# Patient Record
Sex: MALE | Race: WHITE | NOT HISPANIC OR LATINO | Employment: FULL TIME | URBAN - METROPOLITAN AREA
[De-identification: names, ages, dates, MRNs, and addresses within clinical notes are randomized per-mention and may not be internally consistent; named-entity substitution may affect disease eponyms.]

---

## 2017-06-13 ENCOUNTER — ALLSCRIPTS OFFICE VISIT (OUTPATIENT)
Dept: OTHER | Facility: OTHER | Age: 22
End: 2017-06-13

## 2017-10-10 ENCOUNTER — ALLSCRIPTS OFFICE VISIT (OUTPATIENT)
Dept: OTHER | Facility: OTHER | Age: 22
End: 2017-10-10

## 2017-10-11 NOTE — PROGRESS NOTES
Assessment  1  Sebaceous cyst (706 2) (L72 3)    Discussion/Summary    [place] spots / seb cystthis is not herpes , rec conservative care ,  The patient was counseled regarding instructions for management,-patient and family education,-importance of compliance with treatment  Possible side effects of new medications were reviewed with the patient/guardian today  The treatment plan was reviewed with the patient/guardian  The patient/guardian understands and agrees with the treatment plan      Chief Complaint  Patient is in the office today complaining of having white blisters in his lips and he would like to be checked for herpes  History of Present Illness  HPI: has thes small white bumps on the inside of lips , hard to tell they are there , has just started to notice them? not tender, nor blister, do not get any worse actually not sure not treated with anything changes in diet it be herpes? Review of Systems    Constitutional: no fever or chills, feels well, no tiredness, no recent weight loss or weight gain  ENT: no complaints of earache, no loss of hearing, no nosebleeds or nasal discharge, no sore throat or hoarseness  Cardiovascular: no complaints of slow or fast heart rate, no chest pain, no palpitations, no leg claudication or lower extremity edema  Respiratory: no complaints of shortness of breath, no wheezing or cough, no dyspnea on exertion, no orthopnea or PND  Gastrointestinal: no complaints of abdominal pain, no constipation, no nausea or vomiting, no diarrhea or bloody stools  Genitourinary: no complaints of dysuria or incontinence, no hesitancy, no nocturia, no genital lesion, no inadequacy of penile erection  Integumentary: skin lesion  ROS reviewed  Active Problems  1  Need for influenza vaccination (V04 81) (Z23)    Past Medical History  Active Problems And Past Medical History Reviewed:    The active problems and past medical history were reviewed and updated today  Family History  Mother    1  Denied: Family history of mental disorder   2  Denied: Family history of substance abuse  Father    3  Denied: Family history of mental disorder   4  Denied: Family history of substance abuse  Family History Reviewed: The family history was reviewed and updated today  Social History   · Always uses seat belt   · Denies alcohol consumption (V49 89) (Z78 9)   · Employed   · Never a smoker   · Seeing a dentist   · Single  The social history was reviewed and updated today  The social history was reviewed and is unchanged  Surgical History  Surgical History Reviewed: The surgical history was reviewed and updated today  Current Meds   1  Clindamycin Phosphate GEL; Therapy: (Recorded:[id number]) to Recorded    The medication list was reviewed and updated today  Allergies  1  No Known Drug Allergies    Vitals   Recorded: [id number] 09:14AM   Temperature 97 2 F   Heart Rate 63   Respiration 15   Systolic 523   Diastolic 62   Height 6 ft 2 in   Weight 153 lb 2 oz   BMI Calculated 19 66   BSA Calculated 1 94   O2 Saturation 97     Physical Exam    Constitutional   General appearance: No acute distress, well appearing and well nourished  Eyes   Conjunctiva and lids: No swelling, erythema, or discharge  Pupils and irises: Equal, round and reactive to light  Ears, Nose, Mouth, and Throat   External inspection of ears and nose: Normal     Otoscopic examination: Tympanic membrance translucent with normal light reflex  Canals patent without erythema  Nasal mucosa, septum, and turbinates: Normal without edema or erythema  -small less than mm , yellow / white at vermillion border  Pulmonary   Respiratory effort: No increased work of breathing or signs of respiratory distress  Lymphatic   Palpation of lymph nodes in neck: No lymphadenopathy  Skin   Skin and subcutaneous tissue: Normal without rashes or lesions      Psychiatric   Orientation to person, place and time: Normal     Mood and affect: Normal          Signatures   Electronically signed by :  SHERI Sandoval; Oct 10 2017 11:00AM EST                       (Author)    Electronically signed by : IMANI Mejia ; Oct 10 2017 11:36AM EST

## 2018-01-13 VITALS
HEART RATE: 60 BPM | SYSTOLIC BLOOD PRESSURE: 108 MMHG | OXYGEN SATURATION: 99 % | WEIGHT: 166.38 LBS | BODY MASS INDEX: 21.35 KG/M2 | DIASTOLIC BLOOD PRESSURE: 64 MMHG | HEIGHT: 74 IN

## 2018-01-13 VITALS
SYSTOLIC BLOOD PRESSURE: 118 MMHG | BODY MASS INDEX: 19.65 KG/M2 | HEIGHT: 74 IN | WEIGHT: 153.13 LBS | TEMPERATURE: 97.2 F | HEART RATE: 63 BPM | OXYGEN SATURATION: 97 % | RESPIRATION RATE: 15 BRPM | DIASTOLIC BLOOD PRESSURE: 62 MMHG

## 2018-06-18 ENCOUNTER — OFFICE VISIT (OUTPATIENT)
Dept: FAMILY MEDICINE CLINIC | Facility: CLINIC | Age: 23
End: 2018-06-18
Payer: COMMERCIAL

## 2018-06-18 VITALS
DIASTOLIC BLOOD PRESSURE: 62 MMHG | TEMPERATURE: 96.8 F | SYSTOLIC BLOOD PRESSURE: 114 MMHG | HEART RATE: 50 BPM | WEIGHT: 162 LBS | HEIGHT: 74 IN | BODY MASS INDEX: 20.79 KG/M2 | OXYGEN SATURATION: 98 %

## 2018-06-18 DIAGNOSIS — Z00.00 WELL ADULT HEALTH CHECK: Primary | ICD-10-CM

## 2018-06-18 PROCEDURE — 99395 PREV VISIT EST AGE 18-39: CPT | Performed by: FAMILY MEDICINE

## 2018-06-18 NOTE — PROGRESS NOTES
Assessment/Plan:         Diagnoses and all orders for this visit:    Well adult health check       BSA physical   FORM COMPLETED, REVIEWED AGE-APPROPRIATE ANTICIPATORY GUIDANCE      Subjective:      Patient ID: Katie Christy is a 21 y o  male  Here for [facility]   BSA   Last yr at [address] [place] [address] , will be going to [employer] [facility] ,   No current concerns , complaints            The following portions of the patient's history were reviewed and updated as appropriate:   He has no past medical history on file  ,   does not have a problem list on file  ,   has no past surgical history on file  ,  family history is not on file  ,   reports that he has never smoked  He has never used smokeless tobacco  He reports that he does not drink alcohol  His drug history is not on file  ,  has No Known Allergies         Review of Systems   Constitutional: Negative for appetite change, chills, fever and unexpected weight change  HENT: Negative for congestion, dental problem, ear pain, hearing loss, postnasal drip, rhinorrhea, sinus pain, sinus pressure, sneezing, sore throat, tinnitus and voice change  Eyes: Negative for visual disturbance  Respiratory: Negative for apnea, cough, chest tightness and shortness of breath  Cardiovascular: Negative for chest pain, palpitations and leg swelling  Gastrointestinal: Negative for abdominal pain, blood in stool, constipation, diarrhea, nausea and vomiting  Endocrine: Negative for cold intolerance, heat intolerance, polydipsia, polyphagia and polyuria  Genitourinary: Negative for decreased urine volume, difficulty urinating, dysuria, frequency and hematuria  Musculoskeletal: Negative for arthralgias, back pain, gait problem, joint swelling and myalgias  Skin: Negative for color change, rash and wound  Allergic/Immunologic: Negative for environmental allergies and food allergies     Neurological: Negative for dizziness, syncope, weakness, light-headedness, numbness and headaches  Hematological: Negative for adenopathy  Does not bruise/bleed easily  Psychiatric/Behavioral: Negative for sleep disturbance and suicidal ideas  The patient is not nervous/anxious  Objective:  Vitals:    06/18/18 1044   BP: 114/62   Pulse: (!) 50   Temp: (!) 96 8 °F (36 °C)   SpO2: 98%      Physical Exam   Constitutional: He is oriented to person, place, and time  He appears well-developed and well-nourished  HENT:   Head: Normocephalic and atraumatic  Eyes: EOM are normal    Neck: Normal range of motion  Neck supple  Cardiovascular: Normal rate, regular rhythm and normal heart sounds  Pulmonary/Chest: Effort normal and breath sounds normal    Abdominal: Soft  Musculoskeletal: Normal range of motion  Neurological: He is alert and oriented to person, place, and time  He has normal reflexes  Skin: Skin is warm and dry  Psychiatric: He has a normal mood and affect   His behavior is normal  Judgment and thought content normal

## 2018-12-26 ENCOUNTER — OFFICE VISIT (OUTPATIENT)
Dept: FAMILY MEDICINE CLINIC | Facility: CLINIC | Age: 23
End: 2018-12-26
Payer: COMMERCIAL

## 2018-12-26 VITALS
OXYGEN SATURATION: 98 % | BODY MASS INDEX: 19.51 KG/M2 | WEIGHT: 152 LBS | TEMPERATURE: 98.2 F | DIASTOLIC BLOOD PRESSURE: 70 MMHG | HEIGHT: 74 IN | SYSTOLIC BLOOD PRESSURE: 116 MMHG | HEART RATE: 59 BPM

## 2018-12-26 DIAGNOSIS — J03.90 ACUTE TONSILLITIS, UNSPECIFIED ETIOLOGY: ICD-10-CM

## 2018-12-26 DIAGNOSIS — J02.9 ACUTE PHARYNGITIS, UNSPECIFIED ETIOLOGY: ICD-10-CM

## 2018-12-26 LAB — S PYO AG THROAT QL: POSITIVE

## 2018-12-26 PROCEDURE — 99213 OFFICE O/P EST LOW 20 MIN: CPT | Performed by: NURSE PRACTITIONER

## 2018-12-26 PROCEDURE — 87880 STREP A ASSAY W/OPTIC: CPT | Performed by: NURSE PRACTITIONER

## 2018-12-26 RX ORDER — AMOXICILLIN 875 MG/1
875 TABLET, COATED ORAL 2 TIMES DAILY
Qty: 14 TABLET | Refills: 0 | Status: SHIPPED | OUTPATIENT
Start: 2018-12-26 | End: 2019-01-02

## 2018-12-26 NOTE — PATIENT INSTRUCTIONS
Amoxicillin 875 mg twice a day for seven days  Increase hydration  Chloraseptic spray   Tylenol or motrin for pain and/or fever  Rest  Don't share eating utensils  Get new toothbrush   Warm salt water gargles

## 2018-12-26 NOTE — PROGRESS NOTES
Assessment/Plan:    Acute tonsillitis  Amoxicillin 875 mg twice a day for seven days  Increase hydration  Chloraseptic spray   Tylenol or motrin for pain and/or fever  Rest  Don't share eating utensils  Get new toothbrush   Warm salt water gargles         Problem List Items Addressed This Visit     Acute tonsillitis     Amoxicillin 875 mg twice a day for seven days  Increase hydration  Chloraseptic spray   Tylenol or motrin for pain and/or fever  Rest  Don't share eating utensils  Get new toothbrush   Warm salt water gargles         Relevant Medications    amoxicillin (AMOXIL) 875 mg tablet    Other Relevant Orders    POCT rapid strepA (Completed)            Subjective:      Patient ID: Carolina Maddox is a 21 y o  male  Sore throat x 1 week  Cough, denies fever  The following portions of the patient's history were reviewed and updated as appropriate: allergies, current medications, past family history, past medical history, past social history, past surgical history and problem list     Review of Systems   Constitutional: Negative for diaphoresis and fever  HENT: Positive for congestion, sore throat and trouble swallowing  Respiratory: Positive for cough and chest tightness  Negative for shortness of breath and wheezing  Gastrointestinal: Negative  Endocrine: Negative  Genitourinary: Negative  Musculoskeletal: Negative  Skin: Negative  Neurological: Positive for headaches  Hematological: Negative  Psychiatric/Behavioral: Negative  Objective:      /70   Pulse 59   Temp 98 2 °F (36 8 °C)   Ht 6' 2" (1 88 m)   Wt 68 9 kg (152 lb)   SpO2 98%   BMI 19 52 kg/m²          Physical Exam   Constitutional: He is oriented to person, place, and time  He appears well-developed  HENT:   Nose: Right sinus exhibits maxillary sinus tenderness and frontal sinus tenderness  Left sinus exhibits maxillary sinus tenderness and frontal sinus tenderness     Mouth/Throat: Normal dentition  Oropharyngeal exudate, posterior oropharyngeal edema, posterior oropharyngeal erythema and tonsillar abscesses present  Cardiovascular: Normal rate and regular rhythm  Abdominal: Soft  Musculoskeletal: Normal range of motion  Neurological: He is alert and oriented to person, place, and time  Skin: Skin is warm and dry  Psychiatric: He has a normal mood and affect  His behavior is normal  Judgment and thought content normal    Nursing note and vitals reviewed

## 2018-12-31 ENCOUNTER — TELEPHONE (OUTPATIENT)
Dept: OTHER | Facility: OTHER | Age: 23
End: 2018-12-31

## 2019-01-02 ENCOUNTER — TELEPHONE (OUTPATIENT)
Dept: FAMILY MEDICINE CLINIC | Facility: CLINIC | Age: 24
End: 2019-01-02

## 2019-01-02 DIAGNOSIS — J03.90 ACUTE TONSILLITIS, UNSPECIFIED ETIOLOGY: Primary | ICD-10-CM

## 2019-01-02 PROBLEM — L72.0 EPIDERMOID CYST OF SKIN: Status: ACTIVE | Noted: 2017-10-10

## 2019-01-02 RX ORDER — AMOXICILLIN AND CLAVULANATE POTASSIUM 875; 125 MG/1; MG/1
1 TABLET, FILM COATED ORAL EVERY 12 HOURS SCHEDULED
Qty: 14 TABLET | Refills: 0 | Status: SHIPPED | OUTPATIENT
Start: 2019-01-02 | End: 2019-01-09

## 2019-01-02 NOTE — TELEPHONE ENCOUNTER
Patient called he completed the antibiotic but he believes he now has tonsillitis on the right but the left is all cleared up    Patient would like to know if he can have a "stronger" antibiotic since it has returned on the other side, or if he needs to be reevaluated    Please advise

## 2020-12-07 ENCOUNTER — TELEPHONE (OUTPATIENT)
Dept: FAMILY MEDICINE CLINIC | Facility: CLINIC | Age: 25
End: 2020-12-07